# Patient Record
(demographics unavailable — no encounter records)

---

## 2025-01-02 NOTE — HISTORY OF PRESENT ILLNESS
[de-identified] : 78-year-old-male with PMHx of HTN, colon polyps, diverticulosis, HLD, vitamin D deficiency, and prostate cancer in 2014 s/p prostatectomy. Patient presents today for consultation of elevated MCV; referred by Dr. Canseco.  Overall feeling good; no real complaints. Denies fevers, headache, chills, and or night sweats.   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

## 2025-01-02 NOTE — ASSESSMENT
[FreeTextEntry1] : 78-year-old-male with PMHx of HTN, colon polyps, diverticulosis, HLD, vitamin D deficiency, and prostate cancer in 2014 s/p prostatectomy. Patient presents today for consultation of elevated MCV of 102.5 (12/10/24); referred by Dr. Canseco.  Overall feeling good; no real complaints. Denies fevers, headache, chills, and or night sweats.   Plan: 1.  Macrocytosis -Examined medication list--no causative meds on the list. -Will draw vitamin B12, folate, MMA, thyroid function panel, retic ct, haptoglobin, LDH -Alcohol consumption can also cause macrocytosis -Rest of CBC normal.  -Will see him on f/u in 2 weeks to review above results  2.  HCM -had colonoscopy 5 yrs ago

## 2025-01-02 NOTE — REVIEW OF SYSTEMS
[Patient Intake Form Reviewed] : Patient intake form was reviewed [Diarrhea: Grade 0] : Diarrhea: Grade 0 [Negative] : Allergic/Immunologic [Chills] : no chills [Night Sweats] : no night sweats [Fatigue] : no fatigue [Recent Change In Weight] : ~T no recent weight change [Chest Pain] : no chest pain [Shortness Of Breath] : no shortness of breath [Abdominal Pain] : no abdominal pain [Dizziness] : no dizziness [Fainting] : no fainting [Easy Bleeding] : no tendency for easy bleeding [Easy Bruising] : no tendency for easy bruising [FreeTextEntry9] : left thumb pain

## 2025-01-02 NOTE — HISTORY OF PRESENT ILLNESS
[de-identified] : 78-year-old-male with PMHx of HTN, colon polyps, diverticulosis, HLD, vitamin D deficiency, and prostate cancer in 2014 s/p prostatectomy. Patient presents today for consultation of elevated MCV; referred by Dr. Canseco.  Overall feeling good; no real complaints. Denies fevers, headache, chills, and or night sweats.   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

## 2025-01-16 NOTE — HISTORY OF PRESENT ILLNESS
[de-identified] : 78-year-old-male with PMHx of HTN, colon polyps, diverticulosis, HLD, vitamin D deficiency, and prostate cancer in 2014 s/p prostatectomy. Patient presents today for consultation of elevated MCV; referred by Dr. Canseco.  Overall feeling good; no real complaints. Denies fevers, headache, chills, and or night sweats.   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 No complaints. Feels good.

## 2025-01-16 NOTE — HISTORY OF PRESENT ILLNESS
[de-identified] : 78-year-old-male with PMHx of HTN, colon polyps, diverticulosis, HLD, vitamin D deficiency, and prostate cancer in 2014 s/p prostatectomy. Patient presents today for consultation of elevated MCV; referred by Dr. Canseco.  Overall feeling good; no real complaints. Denies fevers, headache, chills, and or night sweats.   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 No complaints. Feels good.

## 2025-01-16 NOTE — ASSESSMENT
[FreeTextEntry1] : 78-year-old-male with PMHx of HTN, colon polyps, diverticulosis, HLD, vitamin D deficiency, and prostate cancer in 2014 s/p prostatectomy. Patient presents today for consultation of elevated MCV of 102.5 (12/10/24); referred by Dr. Canseco.  Overall feeling good; no real complaints. Denies fevers, headache, chills, and or night sweats.   Plan: 1.  Macrocytosis -Examined medication list--no causative meds on the list. -Will draw vitamin B12, folate, MMA, thyroid function panel, retic ct, haptoglobin, LDH -Alcohol consumption can also cause macrocytosis -Rest of CBC normal.  -All workup above has come back normal.  Pt's MCV was also normal when I checked.   -Will send this note to PMD and labs also -Pt can f/u in future if it is needed/recommended by PMD.  2.  HCM -had colonoscopy 5 yrs ago

## 2025-01-16 NOTE — HISTORY OF PRESENT ILLNESS
[de-identified] : 78-year-old-male with PMHx of HTN, colon polyps, diverticulosis, HLD, vitamin D deficiency, and prostate cancer in 2014 s/p prostatectomy. Patient presents today for consultation of elevated MCV; referred by Dr. Canseco.  Overall feeling good; no real complaints. Denies fevers, headache, chills, and or night sweats.   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 No complaints. Feels good.

## 2025-01-16 NOTE — BEGINNING OF VISIT
[0] : 2) Feeling down, depressed, or hopeless: Not at all (0) [PHQ-2 Negative] : PHQ-2 Negative [[id number]] : 0 [Pain Scale: ___] : On a scale of 1-10, today the patient's pain is a(n) [unfilled]. [Former] : Former [> 15 Years] : > 15 Years [Date Discussed (MM/DD/YY): ___] : Discussed: [unfilled] [Patient/Caregiver not ready to engage] : Patient/Caregiver not ready to engage [de-identified] : quit over 40 years ago

## 2025-01-16 NOTE — BEGINNING OF VISIT
[0] : 2) Feeling down, depressed, or hopeless: Not at all (0) [PHQ-2 Negative] : PHQ-2 Negative [[id number]] : 0 [Pain Scale: ___] : On a scale of 1-10, today the patient's pain is a(n) [unfilled]. [Former] : Former [> 15 Years] : > 15 Years [Date Discussed (MM/DD/YY): ___] : Discussed: [unfilled] [Patient/Caregiver not ready to engage] : Patient/Caregiver not ready to engage [de-identified] : quit over 40 years ago

## 2025-01-16 NOTE — BEGINNING OF VISIT
[0] : 2) Feeling down, depressed, or hopeless: Not at all (0) [PHQ-2 Negative] : PHQ-2 Negative [[id number]] : 0 [Pain Scale: ___] : On a scale of 1-10, today the patient's pain is a(n) [unfilled]. [Former] : Former [> 15 Years] : > 15 Years [Date Discussed (MM/DD/YY): ___] : Discussed: [unfilled] [Patient/Caregiver not ready to engage] : Patient/Caregiver not ready to engage [de-identified] : quit over 40 years ago